# Patient Record
Sex: MALE | Race: WHITE | NOT HISPANIC OR LATINO | Employment: FULL TIME | URBAN - METROPOLITAN AREA
[De-identification: names, ages, dates, MRNs, and addresses within clinical notes are randomized per-mention and may not be internally consistent; named-entity substitution may affect disease eponyms.]

---

## 2024-05-21 DIAGNOSIS — M25.561 RIGHT KNEE PAIN: Primary | ICD-10-CM

## 2024-05-21 NOTE — PROGRESS NOTES
[facility]  SPORTS MEDICINE CLINIC VISIT     May 22, 2024        ASSESSMENT & PLAN    63-year-old with acute right knee pain that localizes anteriorly and suspect contusion, possibly aggravation of osteoarthritis.    Reviewed imaging and assessment with patient in detail  Discussed options for treatment including:  -use of compression knee sleeve with patellar cut out  -use of acetaminophen or topical diclofenac PRN  -discussed the benefit of physical therapy and regular exercise  -discussed indication for steroid injection.   -Resume regular activity as tolerated  -otherwise follow up prn      William Reilly MD  [facility] SPORTS MEDICINE CLINIC [place]    -----  Chief Complaint   Patient presents with    Consult For     Right knee pain       SUBJECTIVE  Wellington Cardenas is a/an 63 year old male who is seen as a self referral for evaluation of right knee pain.     The patient is seen by themselves.  The patient is Ambidextrous handed    Onset: 1 month(s) ago. Patient describes injury as playing hockey and had skates taking out from under him  Location of Pain: right knee- patellar tendon  Worsened by: increased walking, achy when elevating  Better with: Icing  Treatments tried:  icing  Associated symptoms: swelling and feeling of instability    Orthopedic/Surgical history: YES - Date: 1995 and 2019 had menisectomy   Social History/Occupation:  - small remodeling as well      REVIEW OF SYSTEMS:  Do you have fever, chills, weight loss? No  Do you have any vision problems? No  Do you have any chest pain or edema? No  Do you have any shortness of breath or wheezing?  No  Do you have stomach problems? No  Do you have any numbness or focal weakness? No  Do you have diabetes? No  Do you have problems with bleeding or clotting? No  Do you have an rashes or other skin lesions? No    OBJECTIVE:  There were no vitals taken for this visit.     Patient is alert, No acute distress, pleasant  and conversational.    Gait: nonantalgic. Normal heel toe gait.    right knee:   Skin intact. No erythema or ecchymosis.  No effusion or soft tissue swelling.    AROM: Zero to approximately 135  with some pain at terminal flexion    Palpation: No medial or lateral facet joint tenderness.  No posterior medial or posterior lateral joint line tenderness   TTP over the inferior pole the patella and proximal patellar tendon.  Also tender over the anterior medial and lateral joint line    Special Tests:  Negative bounce test, negative forced flexion and negative Arnel's.  No ligamentous laxity or pain with valgus or varus stress.  Negative Lachman's, Anterior Drawer and Posterior Drawer     Full Isometric quad strength, extensor mechanism in place     Neurovascularly intact in the lower extremity    Hip and Ankle with full AROM and nontender      RADIOLOGY:    4 view xrays of right knee performed and reviewed independently demonstrating Right knee no acute fracture or dislocation.  At least moderate medial compartment joint space loss.  Mild DJD in the patellofemoral and lateral compartments.. See EMR for formal radiology report.

## 2024-05-22 ENCOUNTER — ANCILLARY PROCEDURE (OUTPATIENT)
Dept: GENERAL RADIOLOGY | Facility: CLINIC | Age: 64
End: 2024-05-22
Attending: FAMILY MEDICINE
Payer: COMMERCIAL

## 2024-05-22 ENCOUNTER — OFFICE VISIT (OUTPATIENT)
Dept: ORTHOPEDICS | Facility: CLINIC | Age: 64
End: 2024-05-22
Payer: COMMERCIAL

## 2024-05-22 DIAGNOSIS — M25.561 ACUTE PAIN OF RIGHT KNEE: Primary | ICD-10-CM

## 2024-05-22 DIAGNOSIS — M25.561 RIGHT KNEE PAIN: ICD-10-CM

## 2024-05-22 PROCEDURE — 73564 X-RAY EXAM KNEE 4 OR MORE: CPT | Mod: RT | Performed by: RADIOLOGY

## 2024-05-22 PROCEDURE — 99203 OFFICE O/P NEW LOW 30 MIN: CPT | Performed by: FAMILY MEDICINE

## 2024-05-22 RX ORDER — TADALAFIL 5 MG/1
TABLET ORAL
COMMUNITY
Start: 2023-06-27

## 2024-05-22 RX ORDER — VIBEGRON 75 MG/1
TABLET, FILM COATED ORAL
COMMUNITY
Start: 2022-10-20

## 2024-05-22 RX ORDER — TRAZODONE HYDROCHLORIDE 50 MG/1
TABLET, FILM COATED ORAL
COMMUNITY
Start: 2023-04-17

## 2024-05-22 NOTE — LETTER
5/22/2024         RE: Wellington Cardenas  [address] [facility] [place] [address]        Dear Colleague,    Thank you for referring your patient, Wellington Cardenas, to the [facility] SPORTS MEDICINE CLINIC [place]. Please see a copy of my visit note below.      [facility]  SPORTS MEDICINE CLINIC VISIT     May 22, 2024        ASSESSMENT & PLAN    63-year-old with acute right knee pain that localizes anteriorly and suspect contusion, possibly aggravation of osteoarthritis.    Reviewed imaging and assessment with patient in detail  Discussed options for treatment including:  -use of compression knee sleeve with patellar cut out  -use of acetaminophen or topical diclofenac PRN  -discussed the benefit of physical therapy and regular exercise  -discussed indication for steroid injection.   -Resume regular activity as tolerated  -otherwise follow up prn      William Reilly MD  [facility] SPORTS MEDICINE [facility]    -----  Chief Complaint   Patient presents with     Consult For     Right knee pain       SUBJECTIVE  Wellington Cardenas is a/an 63 year old male who is seen as a self referral for evaluation of right knee pain.     The patient is seen by themselves.  The patient is Ambidextrous handed    Onset: 1 month(s) ago. Patient describes injury as playing hockey and had skates taking out from under him  Location of Pain: right knee- patellar tendon  Worsened by: increased walking, achy when elevating  Better with: Icing  Treatments tried:  icing  Associated symptoms: swelling and feeling of instability    Orthopedic/Surgical history: YES - Date: 1995 and 2019 had menisectomy   Social History/Occupation:  - small [employer] as well      REVIEW OF SYSTEMS:  Do you have fever, chills, weight loss? No  Do you have any vision problems? No  Do you have any chest pain or edema? No  Do you have any shortness of breath or wheezing?  No  Do you have stomach problems? No  Do you have  any numbness or focal weakness? No  Do you have diabetes? No  Do you have problems with bleeding or clotting? No  Do you have an rashes or other skin lesions? No    OBJECTIVE:  There were no vitals taken for this visit.     Patient is alert, No acute distress, pleasant and conversational.    Gait: nonantalgic. Normal heel toe gait.    right knee:   Skin intact. No erythema or ecchymosis.  No effusion or soft tissue swelling.    AROM: Zero to approximately 135  with some pain at terminal flexion    Palpation: No medial or lateral facet joint tenderness.  No posterior medial or posterior lateral joint line tenderness   TTP over the inferior pole the patella and proximal patellar tendon.  Also tender over the anterior medial and lateral joint line    Special Tests:  Negative bounce test, negative forced flexion and negative Arnel's.  No ligamentous laxity or pain with valgus or varus stress.  Negative Lachman's, Anterior Drawer and Posterior Drawer     Full Isometric quad strength, extensor mechanism in place     Neurovascularly intact in the lower extremity    Hip and Ankle with full AROM and nontender      RADIOLOGY:    4 view xrays of right knee performed and reviewed independently demonstrating Right knee no acute fracture or dislocation.  At least moderate medial compartment joint space loss.  Mild DJD in the patellofemoral and lateral compartments.. See EMR for formal radiology report.                Again, thank you for allowing me to participate in the care of your patient.        Sincerely,        William Reilly MD

## 2024-10-26 ENCOUNTER — HEALTH MAINTENANCE LETTER (OUTPATIENT)
Age: 64
End: 2024-10-26